# Patient Record
Sex: FEMALE | Race: OTHER | NOT HISPANIC OR LATINO | URBAN - METROPOLITAN AREA
[De-identification: names, ages, dates, MRNs, and addresses within clinical notes are randomized per-mention and may not be internally consistent; named-entity substitution may affect disease eponyms.]

---

## 2023-08-25 ENCOUNTER — EMERGENCY (EMERGENCY)
Facility: HOSPITAL | Age: 58
LOS: 1 days | Discharge: HOME | End: 2023-08-25
Attending: EMERGENCY MEDICINE
Payer: COMMERCIAL

## 2023-08-25 VITALS
DIASTOLIC BLOOD PRESSURE: 89 MMHG | OXYGEN SATURATION: 98 % | RESPIRATION RATE: 18 BRPM | HEART RATE: 67 BPM | SYSTOLIC BLOOD PRESSURE: 155 MMHG | TEMPERATURE: 98 F

## 2023-08-25 VITALS
DIASTOLIC BLOOD PRESSURE: 84 MMHG | HEART RATE: 68 BPM | WEIGHT: 160.06 LBS | RESPIRATION RATE: 18 BRPM | OXYGEN SATURATION: 96 % | SYSTOLIC BLOOD PRESSURE: 172 MMHG | HEIGHT: 60 IN | TEMPERATURE: 98 F

## 2023-08-25 PROCEDURE — 99285 EMERGENCY DEPT VISIT HI MDM: CPT

## 2023-08-25 PROCEDURE — 99284 EMERGENCY DEPT VISIT MOD MDM: CPT | Mod: 25

## 2023-08-25 NOTE — ED PROVIDER NOTE - OBJECTIVE STATEMENT
56 yo female with pmh CAD (x1 stent)(berlinta), HTN, Diab presents with eye pain  The pt today received treatment for retinal hemorrhage 2 hours after the procedure the pt began experiencing an immense amount of pain. The pt unwilling to open her eye due to the pain. No headache, facial pain, dizziness, nausea, vomiting, CP, SOB.   The pt decided to come to the ED due to the immense pain. The pt has no further complaints or concerns.

## 2023-08-25 NOTE — ED PROVIDER NOTE - RIGHT EYE:     20/
subconjunctival hemorrhage on the lateral right eye with injection, pt unable to see the vision chart but but can visualize 3 fingers

## 2023-08-25 NOTE — ED PROVIDER NOTE - PROGRESS NOTE DETAILS
Ophthamology was consulted and agreed to see the pt Ophthalmology saw pt and recommended artificial tears and lacrilube at night. Pt's pain has abated

## 2023-08-25 NOTE — ED ADULT NURSE NOTE - OBJECTIVE STATEMENT
56y/o F coming to the ED c.o eye burning. Pt states received treatment for retinal hemorrhage 2 hours after the procedure the pt began experiencing an immense amount of pain. Pt denies any  headache, facial pain, dizziness, nausea, vomiting, CP, SOB. Pt unable to open eye d/t pain. Pt awaiting optho. VSS.

## 2023-08-25 NOTE — CONSULT NOTE ADULT - ASSESSMENT
56 yo female pmhx of CAD, diabetes, htn, hld, and a past ocular history of CEIOL OU and unspecified right eye retinopathy that she receives monthly injections for at [facility] presenting with right eye discomfort that began 2 hours after receiving ANGIE today.     #Right eye discomfort  - patient without any corneal or conjunctival abrasion on exam. Felt better immediately following instillation of proparacaine   - Exam temporal subconj heme and with diffuse SPK OD that is not present OS   - IOP wnl, Va 20/50 OD, EOMs full   - discomfort likely secondary to residual betadine following intravitreal injection remaining on surface of eye   - Eye was washed with balanced salt solution   - please begin artificial tears every two hours while awake and either lacrilube or genteal nightly to the right eye   - Patient to follow up with her retina specialist, Dr. Marisa Pang, on Monday   - instructed to return to the ED if any worsening in symptoms     DW Dr. Campbell     Outpatient follow-up: Patient should follow-up with his/her ophthalmologist or with [facility] Department of Ophthalmology upon discharge at the address below     [facility] Department of Ophthalmology  [address] [place] [address]. [address]  [place] [address]  [phone] 58 yo female pmhx of CAD, diabetes, htn, hld, and a past ocular history of CEIOL OU and unspecified right eye retinopathy that she receives monthly injections for at [facility] presenting with right eye discomfort that began 2 hours after receiving ANGIE today.     #Right eye discomfort  - patient without any corneal or conjunctival abrasion on exam. Felt better immediately following instillation of proparacaine   - Exam temporal subconj heme and with diffuse SPK OD that is not present OS   - IOP wnl, Va 20/50 OD, EOMs full   - discomfort likely secondary to residual betadine following intravitreal injection remaining on surface of eye   - Eye was washed with balanced salt solution   - please begin artificial tears every two hours while awake and either lacrilube or genteal nightly to the right eye   - Patient to follow up with her retina specialist, Dr. Marisa Pang, on Monday   - instructed to return to the ED if any worsening in symptoms     DW Dr. Campbell     Outpatient follow-up: Patient should follow-up with his/her ophthalmologist or with [facility] Department of Ophthalmology upon discharge at the address below     [facility] Department of Ophthalmology  [address] [place] [address]. [address]  [place] [address]  [phone] 56 yo female pmhx of CAD, diabetes, htn, hld, and a past ocular history of CEIOL OU and unspecified right eye retinopathy that she receives monthly injections for at [facility] presenting with right eye discomfort that began 2 hours after receiving ANGIE today.     #Right eye discomfort  - patient without any corneal or conjunctival abrasion on exam. Felt better immediately following instillation of proparacaine   - Exam temporal subconj heme and with diffuse SPK OD that is not present OS   - IOP wnl, Va 20/50 OD, EOMs full   - discomfort likely secondary to residual betadine following intravitreal injection remaining on surface of eye   - Eye was washed with balanced salt solution   - please begin artificial tears every two hours while awake and either lacrilube or genteal nightly to the right eye   - Patient to follow up with her retina specialist, Dr. Marisa Pang, on Monday   - instructed to return to the ED if any worsening in symptoms     DW Dr. Campbell     Outpatient follow-up: Patient should follow-up with his/her ophthalmologist or with [facility] Department of Ophthalmology upon discharge at the address below     [facility] Department of Ophthalmology  [address] [place] [address]. [address]  [place] [address]  [phone]

## 2023-08-25 NOTE — ED PROVIDER NOTE - ATTENDING CONTRIBUTION TO CARE
57-year-old female here with family at bedside complaining of right eye pain 2 hours post injections for retinal hemorrhages, patient is on a DOAC, patient had injections done at ophthalmologist in [place], 2 hours later started developing severe pain in the right eye, worse with light, does report decreased vision as well.  No fever.    Physical exam: Patient in mild distress from pain, has a subconjunctival hemorrhage on the outer aspect of the right sclera, no hyphema seen, pupil equal round reactive to light, patient able to count fingers, difficulty reading MyChart, no respiratory distress, skin intact, psych normal mood and affect, neuro nonfocal.    MDM: 57-year-old female status post eye injections with severe eye pain, subconjunctival hemorrhage seen, patient at risk for intraocular bleed, and other bleeding complications given the fact that patient is on a anticoagulant, will consult ophthalmology, will obtain labs including CBC, chemistries, coags to rule out thrombocytopenia, electrolyte disturbance, and coagulopathy out of proportion to what would be expected with this medication, consult ophthalmology, determine need for acute intervention.

## 2023-08-25 NOTE — ED PROVIDER NOTE - NSICDXPASTMEDICALHX_GEN_ALL_CORE_FT
PAST MEDICAL HISTORY:  CAD (coronary artery disease) x1 stent and on Brilinta    Diabetes     HTN (hypertension)

## 2023-08-25 NOTE — ED ADULT NURSE NOTE - NSFALLUNIVINTERV_ED_ALL_ED
Bed/Stretcher in lowest position, wheels locked, appropriate side rails in place/Call bell, personal items and telephone in reach/Instruct patient to call for assistance before getting out of bed/chair/stretcher/Non-slip footwear applied when patient is off stretcher/[place] to call system/Physically safe environment - no spills, clutter or unnecessary equipment/Purposeful proactive rounding/Room/bathroom lighting operational, light cord in reach

## 2023-08-25 NOTE — ED PROVIDER NOTE - NSFOLLOWUPINSTRUCTIONS_ED_ALL_ED_FT
Please follow up with your ophthalmologist   Please return if pain worsens and you have vision loss  please return if the eye comes red and swollen     Please use artificial tears 2 times a day  Please use Lacrilube before going to bed

## 2023-08-25 NOTE — ED PROVIDER NOTE - PATIENT PORTAL LINK FT
You can access the FollowMyHealth Patient Portal offered by [facility] by registering at the following website: [email or web address]. By joining [facility]’s FollowMyHealth portal, you will also be able to view your health information using other applications (apps) compatible with our system.

## 2023-08-25 NOTE — CONSULT NOTE ADULT - SUBJECTIVE AND OBJECTIVE BOX
[facility] DEPARTMENT OF OPHTHALMOLOGY - INITIAL ADULT CONSULT  -----------------------------------------------------------------------------  Davin Jennings MD, PGY-2  Contact: TEAMS  -----------------------------------------------------------------------------    HPI:  58 yo female pmhx of CAD, diabetes, htn, hld, and a past ocular history of CEIOL OU and unspecified right eye retinopathy that she receives monthly injections for at [facility] presenting with right eye discomfort that began 2 hours after receiving ANGIE today.     PMH: as above  POcHx: ceiol ou, retinopathy OD   FH: denies glc/amd  Social History: denies etoh/tobacco  Ophthalmic Medications: none  Allergies: NKDA    Review of Systems:  Constitutional: No fever, chills  Eyes: No blurry vision, flashes, floaters, FBS, erythema, discharge, double vision, OU  Neuro: No tremors  Cardiovascular: No chest pain, palpitations  Respiratory: No SOB, no cough  GI: No nausea, vomiting, abdominal pain  : No dysuria  Skin: no rash  Psych: no depression  Endocrine: no polyuria, polydipsia  Heme/lymph: no swelling    VITALS: T(C): 36.9 (08-25-23 @ 21:14)  T(F): 98.4 (08-25-23 @ 21:14), Max: 98.4 (08-25-23 @ 21:14)  HR: 68 (08-25-23 @ 21:14) (68 - 68)  BP: 172/84 (08-25-23 @ 21:14) (172/84 - 172/84)  RR:  (18 - 18)  SpO2:  (96% - 96%)  Wt(kg): --  General: AAO x 3, appropriate mood and affect    Ophthalmology Exam:  Visual acuity (cc): 20/50 phni OD, 20/25 OS  Pupils: PERRL OU, no APD  Ttono: 19, 21  Extraocular movements (EOMs): Full OU, no pain, no diplopia  Confrontational Visual Field (CVF): Full OU  Color Plates: 12/12 OU    SLE:  External: Flat OU  Lids/Lashes/Lacrimal Ducts: Flat OU    Sclera/Conjunctiva: temporal sub conj heme OD, W+Q OS   Cornea: diffuse SPK OD, clear OS  Anterior Chamber: D+F OU    Iris: Flat OU  Lens: PCIOL OU     Fundus Exam: dilated with 1% tropicamide and 2.5% phenylephrine  Approval obtained from primary team for dilation  Patient aware that pupils can remained dilated for at least 4-6 hours  Exam performed with 20D lens    Vitreous: wnl OU  Disc, cup/disc: sharp and pink, 0.4 OU  Macula: RPE changes with hypopigmentation OU  Vessels: wnl OU  Periphery: RPE changes with hypopigmentation OU    Labs/Imaging:  *** [facility] DEPARTMENT OF OPHTHALMOLOGY - INITIAL ADULT CONSULT  -----------------------------------------------------------------------------  Davin Jennings MD, PGY-2  Contact: TEAMS  -----------------------------------------------------------------------------    HPI:  56 yo female pmhx of CAD, diabetes, htn, hld, and a past ocular history of CEIOL OU and unspecified right eye retinopathy that she receives monthly injections for at [facility] presenting with right eye discomfort that began 2 hours after receiving ANGIE today.     PMH: as above  POcHx: ceiol ou, retinopathy OD   FH: denies glc/amd  Social History: denies etoh/tobacco  Ophthalmic Medications: none  Allergies: NKDA    Review of Systems:  Constitutional: No fever, chills  Eyes: No blurry vision, flashes, floaters, FBS, erythema, discharge, double vision, OU  Neuro: No tremors  Cardiovascular: No chest pain, palpitations  Respiratory: No SOB, no cough  GI: No nausea, vomiting, abdominal pain  : No dysuria  Skin: no rash  Psych: no depression  Endocrine: no polyuria, polydipsia  Heme/lymph: no swelling    VITALS: T(C): 36.9 (08-25-23 @ 21:14)  T(F): 98.4 (08-25-23 @ 21:14), Max: 98.4 (08-25-23 @ 21:14)  HR: 68 (08-25-23 @ 21:14) (68 - 68)  BP: 172/84 (08-25-23 @ 21:14) (172/84 - 172/84)  RR:  (18 - 18)  SpO2:  (96% - 96%)  Wt(kg): --  General: AAO x 3, appropriate mood and affect    Ophthalmology Exam:  Visual acuity (cc): 20/50 phni OD, 20/25 OS  Pupils: PERRL OU, no APD  Ttono: 19, 21  Extraocular movements (EOMs): Full OU, no pain, no diplopia  Confrontational Visual Field (CVF): Full OU  Color Plates: 12/12 OU    SLE:  External: Flat OU  Lids/Lashes/Lacrimal Ducts: Flat OU    Sclera/Conjunctiva: temporal sub conj heme OD, W+Q OS   Cornea: diffuse SPK OD, clear OS  Anterior Chamber: D+F OU    Iris: Flat OU  Lens: PCIOL OU     Fundus Exam: dilated with 1% tropicamide and 2.5% phenylephrine  Approval obtained from primary team for dilation  Patient aware that pupils can remained dilated for at least 4-6 hours  Exam performed with 20D lens    Vitreous: wnl OU  Disc, cup/disc: sharp and pink, 0.4 OU  Macula: RPE changes with hypopigmentation OU  Vessels: wnl OU  Periphery: RPE changes with hypopigmentation OU    Labs/Imaging:  *** [facility] DEPARTMENT OF OPHTHALMOLOGY - INITIAL ADULT CONSULT  -----------------------------------------------------------------------------  Davin Jennings MD, PGY-2  Contact: TEAMS  -----------------------------------------------------------------------------    HPI:  56 yo female pmhx of CAD, diabetes, htn, hld, and a past ocular history of CEIOL OU and unspecified right eye retinopathy that she receives monthly injections for at [facility] presenting with right eye discomfort that began 2 hours after receiving ANGIE today.     PMH: as above  POcHx: ceiol ou, retinopathy OD   FH: denies glc/amd  Social History: denies etoh/tobacco  Ophthalmic Medications: none  Allergies: NKDA    Review of Systems:  Constitutional: No fever, chills  Eyes: No blurry vision, flashes, floaters, FBS, erythema, discharge, double vision, OU  Neuro: No tremors  Cardiovascular: No chest pain, palpitations  Respiratory: No SOB, no cough  GI: No nausea, vomiting, abdominal pain  : No dysuria  Skin: no rash  Psych: no depression  Endocrine: no polyuria, polydipsia  Heme/lymph: no swelling    VITALS: T(C): 36.9 (08-25-23 @ 21:14)  T(F): 98.4 (08-25-23 @ 21:14), Max: 98.4 (08-25-23 @ 21:14)  HR: 68 (08-25-23 @ 21:14) (68 - 68)  BP: 172/84 (08-25-23 @ 21:14) (172/84 - 172/84)  RR:  (18 - 18)  SpO2:  (96% - 96%)  Wt(kg): --  General: AAO x 3, appropriate mood and affect    Ophthalmology Exam:  Visual acuity (cc): 20/50 phni OD, 20/25 OS  Pupils: PERRL OU, no APD  Ttono: 19, 21  Extraocular movements (EOMs): Full OU, no pain, no diplopia  Confrontational Visual Field (CVF): Full OU  Color Plates: 12/12 OU    SLE:  External: Flat OU  Lids/Lashes/Lacrimal Ducts: Flat OU    Sclera/Conjunctiva: temporal sub conj heme OD, W+Q OS   Cornea: diffuse SPK OD, clear OS  Anterior Chamber: D+F OU    Iris: Flat OU  Lens: PCIOL OU     Fundus Exam: dilated with 1% tropicamide and 2.5% phenylephrine  Approval obtained from primary team for dilation  Patient aware that pupils can remained dilated for at least 4-6 hours  Exam performed with 20D lens    Vitreous: wnl OU  Disc, cup/disc: sharp and pink, 0.4 OU  Macula: RPE changes with hypopigmentation OU  Vessels: wnl OU  Periphery: RPE changes with hypopigmentation OU    Labs/Imaging:  ***
